# Patient Record
(demographics unavailable — no encounter records)

---

## 2024-11-10 NOTE — REVIEW OF SYSTEMS
[Feeling Poorly] : feeling poorly [As Noted in HPI] : as noted in HPI [Negative] : Endocrine [Fever] : no fever [Chills] : no chills [Feeling Tired] : not feeling tired

## 2024-11-10 NOTE — PHYSICAL EXAM
[General Appearance - Alert] : alert [General Appearance - In No Acute Distress] : in no acute distress [PERRL With Normal Accommodation] : pupils were equal in size, round, and reactive to light [Sclera] : the sclera and conjunctiva were normal [Extraocular Movements] : extraocular movements were intact [Outer Ear] : the ears and nose were normal in appearance [Oropharynx] : the oropharynx was normal [Neck Appearance] : the appearance of the neck was normal [Neck Cervical Mass (___cm)] : no neck mass was observed [Jugular Venous Distention Increased] : there was no jugular-venous distention [Thyroid Diffuse Enlargement] : the thyroid was not enlarged [Thyroid Nodule] : there were no palpable thyroid nodules [Auscultation Breath Sounds / Voice Sounds] : lungs were clear to auscultation bilaterally [Heart Rate And Rhythm] : heart rate was normal and rhythm regular [Heart Sounds] : normal S1 and S2 [Murmurs] : no murmurs [Heart Sounds Gallop] : no gallops [Heart Sounds Pericardial Friction Rub] : no pericardial rub [Bowel Sounds] : normal bowel sounds [Abdomen Soft] : soft [Abdomen Tenderness] : non-tender [Abdomen Mass (___ Cm)] : no abdominal mass palpated [Skin Color & Pigmentation] : normal skin color and pigmentation [Skin Turgor] : normal skin turgor [Deep Tendon Reflexes (DTR)] : deep tendon reflexes were 2+ and symmetric [] : no rash [Sensation] : the sensory exam was normal to light touch and pinprick [No Focal Deficits] : no focal deficits [Oriented To Time, Place, And Person] : oriented to person, place, and time [Impaired Insight] : insight and judgment were intact [Affect] : the affect was normal [Abdominal  Ascites] : no ascites [Jaundice] : No jaundice [FreeTextEntry1] : Edema + in both LE

## 2024-11-10 NOTE — ASSESSMENT
[FreeTextEntry1] : Ms. 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 She has a history of moderate ascites, progressive chronic kidney disease (CKD), and worsening anemia. Recent imaging (May 2024) confirmed cirrhosis with moderate ascites and cholelithiasis, without focal hepatic lesions. After a recent large-volume paracentesis, she reports feeling better. Given her CKD progression, SLK (simultaneous liver-kidney) transplant evaluation was discussed, and she is interested in proceeding. An EGD and colonoscopy are scheduled for further evaluation of anemia and intermittent GI bleeding.  Plan  # Cirrhosis of the Liver: The patient has decompensated liver disease with mild ascites, mild portal hypertension, and a low MELD score. Ascites is worsening, and she recently underwent a large-volume paracentesis (LVP), reporting improvement. She experiences intermittent gastrointestinal bleeding, likely from a lower GI source. Follow-up labs are planned.  # Ascites/Lower Extremity Edema: Diuretics are managed by her nephrologist; currently, she takes Lasix 40 mg twice daily and is not on Aldactone per nephrologist's advice. She has ascites confirmed on imaging, and recent ultrasound-guided paracentesis provided relief. Ms. Cooley was counseled on a low-sodium diet (<2 grams per day) with recommendations to avoid adding salt to meals, eliminate salty foods, prefer fresh/frozen produce, and read labels for low-sodium choices.  # Nonalcoholic Fatty Liver Disease: Continuation of Vitamin E 400 IU daily and Ursodiol 500 mg twice daily due to persistently elevated alkaline phosphatase of unclear etiology is recommended.  # Esophageal Varices: EGD on May 1, 2023, showed Grade I esophageal varices (4mm), a hiatal hernia, and non-bleeding erosions in the gastric antrum. Biopsies indicated mild chronic gastritis and chronic active esophagitis. Follow-up EGD is scheduled for November 12, 2024.  # Intermittent Rectal Bleeding: No active bleeding was detected on a recent bleeding scan. While suspecting hemorrhoidal bleeding, diverticular bleeding cannot be ruled out. A repeat colonoscopy may be necessary for recurrent rectal bleeding. Recommended Anusol HC 2.5% rectal application twice daily and sitz baths twice a day.  # Hepatoma Screening: Ultrasound and CT from May 10, 2024, indicated cirrhosis without focal hepatic lesions, moderate ascites, and cholelithiasis.  # Essential Hypertension/Coronary Artery Disease/Dyslipidemia: Condition is better controlled. Follow-up with PCP and cardiology is recommended.  # Colon Cancer Screening: A tubular adenoma was resected during a 2019 colonoscopy. According to guidelines, a repeat colonoscopy is due in 7-10 years. The recent colonoscopy was clear, with no polyps reported.  # Diabetes Mellitus Type 2: Continues management with endocrinology at [facility].  # Transplant Candidacy: The patient has decompensated cirrhosis and progressive CKD, meeting criteria for simultaneous liver-kidney (SLK) evaluation. Recent eGFR 17, and has remained < 60 for more then 3 months. The transplant evaluation process will be initiated, with both the patient and her daughter in agreement.  Abdominal Pain: Likely related to irritable bowel syndrome (IBS). Advised Bentyl 20 mg every 6 hours as needed.  Follow-Up: Return to the clinic in 1 month.

## 2024-11-10 NOTE — HISTORY OF PRESENT ILLNESS
[FreeTextEntry1] : Ms. OPHELIA MARCANO is a 74 year old female with past medical hx of cirrhosis secondary to BLAND, well compensated liver disease, low MELD score, Mild portal hypertension with grade 1 esophageal varices. No interval episodes of hepatic encephalopathy, GI bleeding, but recent hx of  small ascites on CT scan, and has lower extremity edema.    Prior Imaging: Fibroscan on December 7, 2017, showed F4 (17.3 kPa), S0 disease. A prior fibroscan performed on May 4, 2015, showed F4 disease. -An ultrasound of the abdomen performed on February 10, 2020, revealed features adjustable cirrhosis of the liver without any focal hepatic lesion. She reports bilateral lower extremity edema on June 9, 2021. -She had an MRI on February 7, 2019, which revealed features suggestive of cirrhosis of the liver. No focal mass was seen.   Prior procedures: -She had an upper endoscopy performed by Dr. Solano in early 2015, which reportedly showed no varices. -She had a colonoscopy on April 9, 2019, that showed a small polyp in the sigmoid colon that was removed. Path c/w with tubular adenoma. -Colonoscopy on April 13, 2015, with a tubular adenoma removed from the sigmoid colon. -A colonoscopy performed on March 27, 2012, revealed a tubular adenoma in the ascending colon. -She had an upper endoscopy on September 25, 2019, which revealed grade 1 esophageal varices.  Aug 31, 2023:  Today, presents for a follow-up in the clinic with her daughter. We discussed lack of blood pressure control and diabetic control could be causing chronic kidney disease. In fact, her BP today is 202/71. She will follow up with her PCP/Nephrology/Cardiology for BP control. The patient is still complaining of not having regulated bowel movements since having C-diff, although her frequency of bowel movements is now about 1-2. Her stool is mostly smooshy and is not formed. I have recommended a high-fiber diet.  She denies any nausea or vomiting but reports on-and-off colicky abdominal pain.    October 26, 2023: The patient returns for a follow-up appointment with her daughter. An outpatient albumin infusion was postponed due to her uncontrolled hypertension. Her current medications include Lasix (20 mg, 2 tablets twice a day), Vitamin D3, Diclomine, Nydureon (2 mg), Hydralazine (25 mg three times a day), insulin (30 units nightly, 15 units three times a day), levothyroxine (100 mcg), losartan (50 mg), a multivitamin, pantoprazole, pravastatin (40 mg), and Vitamin E. Patient is having her follow up blood tests through outside provers and facilty, and not immediately accessible to me.   Feb 8, 2024:  Recently admitted to the hospital for lower gastrointestinal bleeding, underwent colonoscopy which revealed no active bleeding but noted diverticulosis. Ultrasound conducted on January 10, 2024, revealed cirrhosis with no focal hepatic lesions identified but showed a mild amount of ascites. She reports significant abdominal distention and consulted a renal doctor who advised discontinuing diuretics. No further episodes of gastrointestinal bleeding have occurred.  April 4, 2024: She has come back for a follow-up visit, this time with her daughter. She mentions experiencing intermittent rectal bleeding again, which has raised her concerns. She is eager to explore additional treatment options. During her last hospital stay, she underwent a colonoscopy that identified diverticulosis without any active bleeding, and hemorrhoids were observed. I explained that the source of bleeding could either be the diverticulosis or the hemorrhoids. Her recent blood tests from April 3, 2024, show no significant change in her hematocrit levels compared to previous tests. Today in the clinic, her vital signs are stable, with a blood pressure of 149/76 and a heart rate of 69 beats per minute. I have offered reassurance. However, I advised that should the bleeding continue, she should visit the emergency department for further assessment. Additionally, I have suggested undergoing a bleeding scan to identify any sites of active bleeding.  June 20, 2024: Patient presents for hepatology follow-up appointment.  She reports she has been overall doing well.  She repots that she skipped her prior scheduled EGD/Colonoscopy as on the day prior the scheduled date she was not feeling well, and her blood sugar was 800.  We reviewed prior blood test results from April 3, 2024.  This revealed CKD with a serum creatinine 2.65 mg/dL.  Liver test revealed total bilirubin 0.7 mg/dL, AST 49, ALT 23, alkaline phosphatase 396.  Her hemoglobin was 8.2 g/dL with a platelet count 105,000.  INR remains 0.98.  He had an ultrasound of the abdomen on May 10, 2024 that revealed cirrhosis without any focal hepatic lesion.  Moderate volume ascites was noted.  During her last visit she was reporting intermittent GI bleeding.  We did a bleeding scan on 8/24/2024 that revealed no evidence of active GI bleeding.  Her last colonoscopy from January 26, 2024 revealed mild diverticulosis in the sigmoid colon.  Old clotted blood in the sigmoid colon and in the transverse colon was also noted.  One 4 mm polyp nonbleeding in the descending colon removed with a snare.  Nonbleeding external and internal hemorrhoids were noted.  It was thought that the hematochezia may be related to diverticulosis.  There was no active bleeding during the colonoscopy. She also reports no further GI bleeding. She is getting Epogen though her nephrologist. She is on Lasix 40 mg bid.   November 6, 2024:The patient returns for a follow-up accompanied by her daughter. She reports increasing fatigue and recent weight loss. She recently underwent a large-volume paracentesis (LVP) and feels better as a result. The last abdominal ultrasound from May 10, 2024, showed findings consistent with cirrhosis, moderate ascites, and cholelithiasis, with no focal hepatic lesions. She has progressive chronic kidney disease (CKD) and would benefit from an evaluation for simultaneous liver-kidney (SLK) transplant. I discussed the transplant evaluation process in detail, and she expressed a desire to proceed. Additionally, due to worsening anemia, an EGD and colonoscopy are scheduled for November 12, 2024.

## 2024-12-19 NOTE — ASSESSMENT
[FreeTextEntry1] : Ms. 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 She has a history of moderate ascites, progressive chronic kidney disease (CKD), and worsening anemia. Recent imaging (May 2024) confirmed cirrhosis with moderate ascites and cholelithiasis, without focal hepatic lesions. After a recent large-volume paracentesis, she reports feeling better. Given her CKD progression, SLK (simultaneous liver-kidney) transplant evaluation was discussed, and she is interested in proceeding. An EGD and colonoscopy are scheduled for further evaluation of anemia and intermittent GI bleeding.  Plan  # Cirrhosis of the Liver: The patient has decompensated liver disease with mild ascites, mild portal hypertension, and a low MELD score. Ascites is worsening, and she recently underwent a large-volume paracentesis (LVP), reporting improvement. She experiences intermittent gastrointestinal bleeding, likely from a lower GI source. Follow-up labs are planned.  # Ascites/Lower Extremity Edema: Diuretics are managed by her nephrologist; currently, she takes Lasix 40 mg twice daily and is not on Aldactone per nephrologist's advice. Ms. Cooley was counseled on a low-sodium diet (<2 grams per day) with recommendations to avoid adding salt to meals, eliminate salty foods, prefer fresh/frozen produce, and read labels for low-sodium choices.  # Nonalcoholic Fatty Liver Disease: Continuation of Vitamin E 400 IU daily and Ursodiol 500 mg twice daily due to persistently elevated alkaline phosphatase of unclear etiology is recommended.  # Esophageal Varices: EGD on May 1, 2023, showed Grade I esophageal varices (4mm), a hiatal hernia, and non-bleeding erosions in the gastric antrum. Biopsies indicated mild chronic gastritis and chronic active esophagitis. Follow-up EGD is scheduled for November 12, 2024.  # Intermittent Rectal Bleeding: No active bleeding was detected on a recent bleeding scan. While suspecting hemorrhoidal bleeding, diverticular bleeding cannot be ruled out. A repeat colonoscopy may be necessary for recurrent rectal bleeding. Recommended Anusol HC 2.5% rectal application twice daily and sitz baths twice a day.  # Hepatoma Screening: Ultrasound and CT from May 10, 2024, indicated cirrhosis without focal hepatic lesions, moderate ascites, and cholelithiasis.  # Essential Hypertension/Coronary Artery Disease/Dyslipidemia: Condition is better controlled. Follow-up with PCP and cardiology is recommended.  # Colon Cancer Screening: A tubular adenoma was resected during a 2019 colonoscopy. According to guidelines, a repeat colonoscopy is due in 7-10 years. The recent colonoscopy was clear, with no polyps reported.  # Diabetes Mellitus Type 2: Continues management with endocrinology at [facility].  # Transplant Candidacy: The patient has decompensated cirrhosis and progressive CKD, meeting criteria for simultaneous liver-kidney (SLK) evaluation. Recent eGFR 17, and has remained < 60 for more then 3 months. The transplant evaluation process will be initiated, with both the patient and her daughter in agreement.  Abdominal Pain: Likely related to irritable bowel syndrome (IBS). Advised Bentyl 20 mg every 6 hours as needed.  Follow-Up: Return to the clinic in 1 month.

## 2024-12-19 NOTE — HISTORY OF PRESENT ILLNESS
[FreeTextEntry1] : Ms. OPHELIA MARCANO is a 74 year old female with past medical hx of cirrhosis secondary to BLAND, well compensated liver disease, low MELD score, Mild portal hypertension with grade 1 esophageal varices. No interval episodes of hepatic encephalopathy, GI bleeding, but recent hx of  small ascites on CT scan, and has lower extremity edema.    Prior Imaging: Fibroscan on December 7, 2017, showed F4 (17.3 kPa), S0 disease. A prior fibroscan performed on May 4, 2015, showed F4 disease. -An ultrasound of the abdomen performed on February 10, 2020, revealed features adjustable cirrhosis of the liver without any focal hepatic lesion. She reports bilateral lower extremity edema on June 9, 2021. -She had an MRI on February 7, 2019, which revealed features suggestive of cirrhosis of the liver. No focal mass was seen.   Prior procedures: -She had an upper endoscopy performed by Dr. Solano in early 2015, which reportedly showed no varices. -She had a colonoscopy on April 9, 2019, that showed a small polyp in the sigmoid colon that was removed. Path c/w with tubular adenoma. -Colonoscopy on April 13, 2015, with a tubular adenoma removed from the sigmoid colon. -A colonoscopy performed on March 27, 2012, revealed a tubular adenoma in the ascending colon. -She had an upper endoscopy on September 25, 2019, which revealed grade 1 esophageal varices.  Aug 31, 2023:  Today, presents for a follow-up in the clinic with her daughter. We discussed lack of blood pressure control and diabetic control could be causing chronic kidney disease. In fact, her BP today is 202/71. She will follow up with her PCP/Nephrology/Cardiology for BP control. The patient is still complaining of not having regulated bowel movements since having C-diff, although her frequency of bowel movements is now about 1-2. Her stool is mostly smooshy and is not formed. I have recommended a high-fiber diet.  She denies any nausea or vomiting but reports on-and-off colicky abdominal pain.    October 26, 2023: The patient returns for a follow-up appointment with her daughter. An outpatient albumin infusion was postponed due to her uncontrolled hypertension. Her current medications include Lasix (20 mg, 2 tablets twice a day), Vitamin D3, Diclomine, Nydureon (2 mg), Hydralazine (25 mg three times a day), insulin (30 units nightly, 15 units three times a day), levothyroxine (100 mcg), losartan (50 mg), a multivitamin, pantoprazole, pravastatin (40 mg), and Vitamin E. Patient is having her follow up blood tests through outside provers and facilty, and not immediately accessible to me.   Feb 8, 2024:  Recently admitted to the hospital for lower gastrointestinal bleeding, underwent colonoscopy which revealed no active bleeding but noted diverticulosis. Ultrasound conducted on January 10, 2024, revealed cirrhosis with no focal hepatic lesions identified but showed a mild amount of ascites. She reports significant abdominal distention and consulted a renal doctor who advised discontinuing diuretics. No further episodes of gastrointestinal bleeding have occurred.  April 4, 2024: She has come back for a follow-up visit, this time with her daughter. She mentions experiencing intermittent rectal bleeding again, which has raised her concerns. She is eager to explore additional treatment options. During her last hospital stay, she underwent a colonoscopy that identified diverticulosis without any active bleeding, and hemorrhoids were observed. I explained that the source of bleeding could either be the diverticulosis or the hemorrhoids. Her recent blood tests from April 3, 2024, show no significant change in her hematocrit levels compared to previous tests. Today in the clinic, her vital signs are stable, with a blood pressure of 149/76 and a heart rate of 69 beats per minute. I have offered reassurance. However, I advised that should the bleeding continue, she should visit the emergency department for further assessment. Additionally, I have suggested undergoing a bleeding scan to identify any sites of active bleeding.  June 20, 2024: Patient presents for hepatology follow-up appointment.  She reports she has been overall doing well.  She repots that she skipped her prior scheduled EGD/Colonoscopy as on the day prior the scheduled date she was not feeling well, and her blood sugar was 800.  We reviewed prior blood test results from April 3, 2024.  This revealed CKD with a serum creatinine 2.65 mg/dL.  Liver test revealed total bilirubin 0.7 mg/dL, AST 49, ALT 23, alkaline phosphatase 396.  Her hemoglobin was 8.2 g/dL with a platelet count 105,000.  INR remains 0.98.  He had an ultrasound of the abdomen on May 10, 2024 that revealed cirrhosis without any focal hepatic lesion.  Moderate volume ascites was noted.  During her last visit she was reporting intermittent GI bleeding.  We did a bleeding scan on 8/24/2024 that revealed no evidence of active GI bleeding.  Her last colonoscopy from January 26, 2024 revealed mild diverticulosis in the sigmoid colon.  Old clotted blood in the sigmoid colon and in the transverse colon was also noted.  One 4 mm polyp nonbleeding in the descending colon removed with a snare.  Nonbleeding external and internal hemorrhoids were noted.  It was thought that the hematochezia may be related to diverticulosis.  There was no active bleeding during the colonoscopy. She also reports no further GI bleeding. She is getting Epogen though her nephrologist. She is on Lasix 40 mg bid.   November 6, 2024:The patient returns for a follow-up accompanied by her daughter. She reports increasing fatigue and recent weight loss. She recently underwent a large-volume paracentesis (LVP) and feels better as a result. The last abdominal ultrasound from May 10, 2024, showed findings consistent with cirrhosis, moderate ascites, and cholelithiasis, with no focal hepatic lesions. She has progressive chronic kidney disease (CKD) and would benefit from an evaluation for simultaneous liver-kidney (SLK) transplant. I discussed the transplant evaluation process in detail, and she expressed a desire to proceed. Additionally, due to worsening anemia, an EGD and colonoscopy are scheduled for November 12, 2024.  December 19, 2024: Patient returns for follow up. States that she received blood transfusion at [facility] and then had follow up with [facility] and has received 2 iron infusions. There is no update on her SLK evaluation at this time. She is s/p EGD/COLO on Nov 12, 2024 - Hemorrhoids found on perianal exam. One 5 mm polyp at the recto-sigmoid colon, removed with a cold snare.  Complete resection. Polyp tissue unable to be retrieved. Diverticulosis in the sigmoid colon. Normal terminal ileum. EGD noted Grade I varices with no banding.

## 2024-12-19 NOTE — PHYSICAL EXAM
[General Appearance - Alert] : alert [General Appearance - In No Acute Distress] : in no acute distress [Sclera] : the sclera and conjunctiva were normal [PERRL With Normal Accommodation] : pupils were equal in size, round, and reactive to light [Extraocular Movements] : extraocular movements were intact [Outer Ear] : the ears and nose were normal in appearance [Oropharynx] : the oropharynx was normal [Neck Appearance] : the appearance of the neck was normal [Neck Cervical Mass (___cm)] : no neck mass was observed [Jugular Venous Distention Increased] : there was no jugular-venous distention [Thyroid Diffuse Enlargement] : the thyroid was not enlarged [Thyroid Nodule] : there were no palpable thyroid nodules [Auscultation Breath Sounds / Voice Sounds] : lungs were clear to auscultation bilaterally [Heart Rate And Rhythm] : heart rate was normal and rhythm regular [Heart Sounds] : normal S1 and S2 [Heart Sounds Gallop] : no gallops [Murmurs] : no murmurs [Heart Sounds Pericardial Friction Rub] : no pericardial rub [Bowel Sounds] : normal bowel sounds [Abdomen Soft] : soft [Abdomen Tenderness] : non-tender [Abdomen Mass (___ Cm)] : no abdominal mass palpated [Skin Color & Pigmentation] : normal skin color and pigmentation [Skin Turgor] : normal skin turgor [] : no rash [Deep Tendon Reflexes (DTR)] : deep tendon reflexes were 2+ and symmetric [Sensation] : the sensory exam was normal to light touch and pinprick [No Focal Deficits] : no focal deficits [Oriented To Time, Place, And Person] : oriented to person, place, and time [Impaired Insight] : insight and judgment were intact [Affect] : the affect was normal [Abdominal  Ascites] : no ascites [Jaundice] : No jaundice [FreeTextEntry1] : Edema + in both LE

## 2024-12-19 NOTE — HISTORY OF PRESENT ILLNESS
[FreeTextEntry1] : Ms. OPHELIA MARCANO is a 74 year old female with past medical hx of cirrhosis secondary to BLAND, well compensated liver disease, low MELD score, Mild portal hypertension with grade 1 esophageal varices. No interval episodes of hepatic encephalopathy, GI bleeding, but recent hx of  small ascites on CT scan, and has lower extremity edema.    Prior Imaging: Fibroscan on December 7, 2017, showed F4 (17.3 kPa), S0 disease. A prior fibroscan performed on May 4, 2015, showed F4 disease. -An ultrasound of the abdomen performed on February 10, 2020, revealed features adjustable cirrhosis of the liver without any focal hepatic lesion. She reports bilateral lower extremity edema on June 9, 2021. -She had an MRI on February 7, 2019, which revealed features suggestive of cirrhosis of the liver. No focal mass was seen.   Prior procedures: -She had an upper endoscopy performed by Dr. Solano in early 2015, which reportedly showed no varices. -She had a colonoscopy on April 9, 2019, that showed a small polyp in the sigmoid colon that was removed. Path c/w with tubular adenoma. -Colonoscopy on April 13, 2015, with a tubular adenoma removed from the sigmoid colon. -A colonoscopy performed on March 27, 2012, revealed a tubular adenoma in the ascending colon. -She had an upper endoscopy on September 25, 2019, which revealed grade 1 esophageal varices.  Aug 31, 2023:  Today, presents for a follow-up in the clinic with her daughter. We discussed lack of blood pressure control and diabetic control could be causing chronic kidney disease. In fact, her BP today is 202/71. She will follow up with her PCP/Nephrology/Cardiology for BP control. The patient is still complaining of not having regulated bowel movements since having C-diff, although her frequency of bowel movements is now about 1-2. Her stool is mostly smooshy and is not formed. I have recommended a high-fiber diet.  She denies any nausea or vomiting but reports on-and-off colicky abdominal pain.    October 26, 2023: The patient returns for a follow-up appointment with her daughter. An outpatient albumin infusion was postponed due to her uncontrolled hypertension. Her current medications include Lasix (20 mg, 2 tablets twice a day), Vitamin D3, Diclomine, Nydureon (2 mg), Hydralazine (25 mg three times a day), insulin (30 units nightly, 15 units three times a day), levothyroxine (100 mcg), losartan (50 mg), a multivitamin, pantoprazole, pravastatin (40 mg), and Vitamin E. Patient is having her follow up blood tests through outside provers and facilty, and not immediately accessible to me.   Feb 8, 2024:  Recently admitted to the hospital for lower gastrointestinal bleeding, underwent colonoscopy which revealed no active bleeding but noted diverticulosis. Ultrasound conducted on January 10, 2024, revealed cirrhosis with no focal hepatic lesions identified but showed a mild amount of ascites. She reports significant abdominal distention and consulted a renal doctor who advised discontinuing diuretics. No further episodes of gastrointestinal bleeding have occurred.  April 4, 2024: She has come back for a follow-up visit, this time with her daughter. She mentions experiencing intermittent rectal bleeding again, which has raised her concerns. She is eager to explore additional treatment options. During her last hospital stay, she underwent a colonoscopy that identified diverticulosis without any active bleeding, and hemorrhoids were observed. I explained that the source of bleeding could either be the diverticulosis or the hemorrhoids. Her recent blood tests from April 3, 2024, show no significant change in her hematocrit levels compared to previous tests. Today in the clinic, her vital signs are stable, with a blood pressure of 149/76 and a heart rate of 69 beats per minute. I have offered reassurance. However, I advised that should the bleeding continue, she should visit the emergency department for further assessment. Additionally, I have suggested undergoing a bleeding scan to identify any sites of active bleeding.  June 20, 2024: Patient presents for hepatology follow-up appointment.  She reports she has been overall doing well.  She repots that she skipped her prior scheduled EGD/Colonoscopy as on the day prior the scheduled date she was not feeling well, and her blood sugar was 800.  We reviewed prior blood test results from April 3, 2024.  This revealed CKD with a serum creatinine 2.65 mg/dL.  Liver test revealed total bilirubin 0.7 mg/dL, AST 49, ALT 23, alkaline phosphatase 396.  Her hemoglobin was 8.2 g/dL with a platelet count 105,000.  INR remains 0.98.  He had an ultrasound of the abdomen on May 10, 2024 that revealed cirrhosis without any focal hepatic lesion.  Moderate volume ascites was noted.  During her last visit she was reporting intermittent GI bleeding.  We did a bleeding scan on 8/24/2024 that revealed no evidence of active GI bleeding.  Her last colonoscopy from January 26, 2024 revealed mild diverticulosis in the sigmoid colon.  Old clotted blood in the sigmoid colon and in the transverse colon was also noted.  One 4 mm polyp nonbleeding in the descending colon removed with a snare.  Nonbleeding external and internal hemorrhoids were noted.  It was thought that the hematochezia may be related to diverticulosis.  There was no active bleeding during the colonoscopy. She also reports no further GI bleeding. She is getting Epogen though her nephrologist. She is on Lasix 40 mg bid.   November 6, 2024:The patient returns for a follow-up accompanied by her daughter. She reports increasing fatigue and recent weight loss. She recently underwent a large-volume paracentesis (LVP) and feels better as a result. The last abdominal ultrasound from May 10, 2024, showed findings consistent with cirrhosis, moderate ascites, and cholelithiasis, with no focal hepatic lesions. She has progressive chronic kidney disease (CKD) and would benefit from an evaluation for simultaneous liver-kidney (SLK) transplant. I discussed the transplant evaluation process in detail, and she expressed a desire to proceed. Additionally, due to worsening anemia, an EGD and colonoscopy are scheduled for November 12, 2024.  December 19, 2024: Patient returns for follow up. States that she received blood transfusion at [facility] and then had follow up with [facility] and has received 2 iron infusions. There is no update on her SLK evaluation at this time. She is s/p EGD/COLO on Nov 12, 2024 - Hemorrhoids found on perianal exam. One 5 mm polyp at the recto-sigmoid colon, removed with a cold snare.  Complete resection. Polyp tissue unable to be retrieved. Diverticulosis in the sigmoid colon. Normal terminal ileum. EGD noted Grade I varices with no banding.

## 2024-12-19 NOTE — HISTORY OF PRESENT ILLNESS
[FreeTextEntry1] : Ms. OPHELIA MARCANO is a 74 year old female with past medical hx of cirrhosis secondary to BLAND, well compensated liver disease, low MELD score, Mild portal hypertension with grade 1 esophageal varices. No interval episodes of hepatic encephalopathy, GI bleeding, but recent hx of  small ascites on CT scan, and has lower extremity edema.    Prior Imaging: Fibroscan on December 7, 2017, showed F4 (17.3 kPa), S0 disease. A prior fibroscan performed on May 4, 2015, showed F4 disease. -An ultrasound of the abdomen performed on February 10, 2020, revealed features adjustable cirrhosis of the liver without any focal hepatic lesion. She reports bilateral lower extremity edema on June 9, 2021. -She had an MRI on February 7, 2019, which revealed features suggestive of cirrhosis of the liver. No focal mass was seen.   Prior procedures: -She had an upper endoscopy performed by Dr. Solano in early 2015, which reportedly showed no varices. -She had a colonoscopy on April 9, 2019, that showed a small polyp in the sigmoid colon that was removed. Path c/w with tubular adenoma. -Colonoscopy on April 13, 2015, with a tubular adenoma removed from the sigmoid colon. -A colonoscopy performed on March 27, 2012, revealed a tubular adenoma in the ascending colon. -She had an upper endoscopy on September 25, 2019, which revealed grade 1 esophageal varices.  Aug 31, 2023:  Today, presents for a follow-up in the clinic with her daughter. We discussed lack of blood pressure control and diabetic control could be causing chronic kidney disease. In fact, her BP today is 202/71. She will follow up with her PCP/Nephrology/Cardiology for BP control. The patient is still complaining of not having regulated bowel movements since having C-diff, although her frequency of bowel movements is now about 1-2. Her stool is mostly smooshy and is not formed. I have recommended a high-fiber diet.  She denies any nausea or vomiting but reports on-and-off colicky abdominal pain.    October 26, 2023: The patient returns for a follow-up appointment with her daughter. An outpatient albumin infusion was postponed due to her uncontrolled hypertension. Her current medications include Lasix (20 mg, 2 tablets twice a day), Vitamin D3, Diclomine, Nydureon (2 mg), Hydralazine (25 mg three times a day), insulin (30 units nightly, 15 units three times a day), levothyroxine (100 mcg), losartan (50 mg), a multivitamin, pantoprazole, pravastatin (40 mg), and Vitamin E. Patient is having her follow up blood tests through outside provers and facilty, and not immediately accessible to me.   Feb 8, 2024:  Recently admitted to the hospital for lower gastrointestinal bleeding, underwent colonoscopy which revealed no active bleeding but noted diverticulosis. Ultrasound conducted on January 10, 2024, revealed cirrhosis with no focal hepatic lesions identified but showed a mild amount of ascites. She reports significant abdominal distention and consulted a renal doctor who advised discontinuing diuretics. No further episodes of gastrointestinal bleeding have occurred.  April 4, 2024: She has come back for a follow-up visit, this time with her daughter. She mentions experiencing intermittent rectal bleeding again, which has raised her concerns. She is eager to explore additional treatment options. During her last hospital stay, she underwent a colonoscopy that identified diverticulosis without any active bleeding, and hemorrhoids were observed. I explained that the source of bleeding could either be the diverticulosis or the hemorrhoids. Her recent blood tests from April 3, 2024, show no significant change in her hematocrit levels compared to previous tests. Today in the clinic, her vital signs are stable, with a blood pressure of 149/76 and a heart rate of 69 beats per minute. I have offered reassurance. However, I advised that should the bleeding continue, she should visit the emergency department for further assessment. Additionally, I have suggested undergoing a bleeding scan to identify any sites of active bleeding.  June 20, 2024: Patient presents for hepatology follow-up appointment.  She reports she has been overall doing well.  She repots that she skipped her prior scheduled EGD/Colonoscopy as on the day prior the scheduled date she was not feeling well, and her blood sugar was 800.  We reviewed prior blood test results from April 3, 2024.  This revealed CKD with a serum creatinine 2.65 mg/dL.  Liver test revealed total bilirubin 0.7 mg/dL, AST 49, ALT 23, alkaline phosphatase 396.  Her hemoglobin was 8.2 g/dL with a platelet count 105,000.  INR remains 0.98.  He had an ultrasound of the abdomen on May 10, 2024 that revealed cirrhosis without any focal hepatic lesion.  Moderate volume ascites was noted.  During her last visit she was reporting intermittent GI bleeding.  We did a bleeding scan on 8/24/2024 that revealed no evidence of active GI bleeding.  Her last colonoscopy from January 26, 2024 revealed mild diverticulosis in the sigmoid colon.  Old clotted blood in the sigmoid colon and in the transverse colon was also noted.  One 4 mm polyp nonbleeding in the descending colon removed with a snare.  Nonbleeding external and internal hemorrhoids were noted.  It was thought that the hematochezia may be related to diverticulosis.  There was no active bleeding during the colonoscopy. She also reports no further GI bleeding. She is getting Epogen though her nephrologist. She is on Lasix 40 mg bid.   November 6, 2024:The patient returns for a follow-up accompanied by her daughter. She reports increasing fatigue and recent weight loss. She recently underwent a large-volume paracentesis (LVP) and feels better as a result. The last abdominal ultrasound from May 10, 2024, showed findings consistent with cirrhosis, moderate ascites, and cholelithiasis, with no focal hepatic lesions. She has progressive chronic kidney disease (CKD) and would benefit from an evaluation for simultaneous liver-kidney (SLK) transplant. I discussed the transplant evaluation process in detail, and she expressed a desire to proceed. Additionally, due to worsening anemia, an EGD and colonoscopy are scheduled for November 12, 2024.  December 19, 2024: Patient returns for follow up. States that she received blood transfusion at [facility] and then had follow up with [facility] and has received 2 iron infusions. There is no update on her SLK evaluation at this time. She is s/p EGD/COLO on Nov 12, 2024 - Hemorrhoids found on perianal exam. One 5 mm polyp at the recto-sigmoid colon, removed with a cold snare.  Complete resection. Polyp tissue unable to be retrieved. Diverticulosis in the sigmoid colon. Normal terminal ileum. EGD noted Grade I varices with no banding.

## 2024-12-19 NOTE — ASSESSMENT
[FreeTextEntry1] : Ms. 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 She has a history of moderate ascites, progressive chronic kidney disease (CKD), and worsening anemia. Recent imaging (May 2024) confirmed cirrhosis with moderate ascites and cholelithiasis, without focal hepatic lesions. After a recent large-volume paracentesis, she reports feeling better. Given her CKD progression, SLK (simultaneous liver-kidney) transplant evaluation was discussed, and she is interested in proceeding. An EGD and colonoscopy are scheduled for further evaluation of anemia and intermittent GI bleeding.  Plan  # Cirrhosis of the Liver: The patient has decompensated liver disease with mild ascites, mild portal hypertension, and a low MELD score. Ascites is worsening, and she recently underwent a large-volume paracentesis (LVP), reporting improvement. She experiences intermittent gastrointestinal bleeding, likely from a lower GI source. Follow-up labs are planned.  # Ascites/Lower Extremity Edema: Diuretics are managed by her nephrologist; currently, she takes Lasix 40 mg twice daily and is not on Aldactone per nephrologist's advice. Ms. Cooley was counseled on a low-sodium diet (<2 grams per day) with recommendations to avoid adding salt to meals, eliminate salty foods, prefer fresh/frozen produce, and read labels for low-sodium choices.  # Nonalcoholic Fatty Liver Disease: Continuation of Vitamin E 400 IU daily and Ursodiol 500 mg twice daily due to persistently elevated alkaline phosphatase of unclear etiology is recommended.  # Esophageal Varices: EGD on May 1, 2023, showed Grade I esophageal varices (4mm), a hiatal hernia, and non-bleeding erosions in the gastric antrum. Biopsies indicated mild chronic gastritis and chronic active esophagitis. Follow-up EGD is scheduled for November 12, 2024.  # Intermittent Rectal Bleeding: No active bleeding was detected on a recent bleeding scan. While suspecting hemorrhoidal bleeding, diverticular bleeding cannot be ruled out. A repeat colonoscopy may be necessary for recurrent rectal bleeding. Recommended Anusol HC 2.5% rectal application twice daily and sitz baths twice a day.  # Hepatoma Screening: Ultrasound and CT from May 10, 2024, indicated cirrhosis without focal hepatic lesions, moderate ascites, and cholelithiasis.  # Essential Hypertension/Coronary Artery Disease/Dyslipidemia: Condition is better controlled. Follow-up with PCP and cardiology is recommended.  # Colon Cancer Screening: A tubular adenoma was resected during a 2019 colonoscopy. According to guidelines, a repeat colonoscopy is due in 7-10 years. The recent colonoscopy was clear, with no polyps reported.  # Diabetes Mellitus Type 2: Continues management with endocrinology at [facility].  # Transplant Candidacy: The patient has decompensated cirrhosis and progressive CKD, meeting criteria for simultaneous liver-kidney (SLK) evaluation. Recent eGFR 17, and has remained < 60 for more then 3 months. The transplant evaluation process will be initiated, with both the patient and her daughter in agreement.  Abdominal Pain: Likely related to irritable bowel syndrome (IBS). Advised Bentyl 20 mg every 6 hours as needed.  Follow-Up: Return to the clinic in 1 month.

## 2024-12-19 NOTE — ASSESSMENT
[FreeTextEntry1] : Ms. 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 She has a history of moderate ascites, progressive chronic kidney disease (CKD), and worsening anemia. Recent imaging (May 2024) confirmed cirrhosis with moderate ascites and cholelithiasis, without focal hepatic lesions. After a recent large-volume paracentesis, she reports feeling better. Given her CKD progression, SLK (simultaneous liver-kidney) transplant evaluation was discussed, and she is interested in proceeding. An EGD and colonoscopy are scheduled for further evaluation of anemia and intermittent GI bleeding.  Plan  # Cirrhosis of the Liver: The patient has decompensated liver disease with mild ascites, mild portal hypertension, and a low MELD score. Ascites is worsening, and she recently underwent a large-volume paracentesis (LVP), reporting improvement. She experiences intermittent gastrointestinal bleeding, likely from a lower GI source. Follow-up labs are planned.  # Ascites/Lower Extremity Edema: Diuretics are managed by her nephrologist; currently, she takes Lasix 40 mg twice daily and is not on Aldactone per nephrologist's advice. Ms. Cooley was counseled on a low-sodium diet (<2 grams per day) with recommendations to avoid adding salt to meals, eliminate salty foods, prefer fresh/frozen produce, and read labels for low-sodium choices.  # Nonalcoholic Fatty Liver Disease: Continuation of Vitamin E 400 IU daily and Ursodiol 500 mg twice daily due to persistently elevated alkaline phosphatase of unclear etiology is recommended.  # Esophageal Varices: EGD on May 1, 2023, showed Grade I esophageal varices (4mm), a hiatal hernia, and non-bleeding erosions in the gastric antrum. Biopsies indicated mild chronic gastritis and chronic active esophagitis. Follow-up EGD is scheduled for November 12, 2024.  # Intermittent Rectal Bleeding: No active bleeding was detected on a recent bleeding scan. While suspecting hemorrhoidal bleeding, diverticular bleeding cannot be ruled out. A repeat colonoscopy may be necessary for recurrent rectal bleeding. Recommended Anusol HC 2.5% rectal application twice daily and sitz baths twice a day.  # Hepatoma Screening: Ultrasound and CT from May 10, 2024, indicated cirrhosis without focal hepatic lesions, moderate ascites, and cholelithiasis.  # Essential Hypertension/Coronary Artery Disease/Dyslipidemia: Condition is better controlled. Follow-up with PCP and cardiology is recommended.  # Colon Cancer Screening: A tubular adenoma was resected during a 2019 colonoscopy. According to guidelines, a repeat colonoscopy is due in 7-10 years. The recent colonoscopy was clear, with no polyps reported.  # Diabetes Mellitus Type 2: Continues management with endocrinology at [facility].  # Transplant Candidacy: The patient has decompensated cirrhosis and progressive CKD, meeting criteria for simultaneous liver-kidney (SLK) evaluation. Recent eGFR 17, and has remained < 60 for more then 3 months. The transplant evaluation process will be initiated, with both the patient and her daughter in agreement.  Abdominal Pain: Likely related to irritable bowel syndrome (IBS). Advised Bentyl 20 mg every 6 hours as needed.  Follow-Up: Return to the clinic in 1 month.